# Patient Record
(demographics unavailable — no encounter records)

---

## 2025-01-02 NOTE — HISTORY OF PRESENT ILLNESS
[de-identified] : Patient is here today for evaluation of both knees post anterior aspect as a source of pain x-rays from 2022 show well-maintained joint spaces normal tibial aspect minimal spurring along the patella trochlea  Physical exam she is a thin female she is got that no effusion full range of motion nontender over the joint lines negative Homans signs both knees are stable no significant pain anteriorly with negative inhibition and apprehension nontender with quad tendon very tight popliteal angle 90 degrees  Diagnosis is patellofemoral chondromalacia recommend physical therapy or stretching prescription is given hold off on any medicines we will see her back as needed